# Patient Record
Sex: FEMALE | Race: WHITE | NOT HISPANIC OR LATINO | Employment: OTHER | URBAN - METROPOLITAN AREA
[De-identification: names, ages, dates, MRNs, and addresses within clinical notes are randomized per-mention and may not be internally consistent; named-entity substitution may affect disease eponyms.]

---

## 2021-06-29 ENCOUNTER — OFFICE VISIT - RIVER FALLS (OUTPATIENT)
Dept: FAMILY MEDICINE | Facility: CLINIC | Age: 81
End: 2021-06-29

## 2021-06-29 ASSESSMENT — MIFFLIN-ST. JEOR: SCORE: 1179.94

## 2021-08-10 ENCOUNTER — OFFICE VISIT - RIVER FALLS (OUTPATIENT)
Dept: FAMILY MEDICINE | Facility: CLINIC | Age: 81
End: 2021-08-10

## 2021-08-10 ASSESSMENT — MIFFLIN-ST. JEOR: SCORE: 1160.89

## 2022-02-12 VITALS
SYSTOLIC BLOOD PRESSURE: 122 MMHG | HEIGHT: 65 IN | DIASTOLIC BLOOD PRESSURE: 68 MMHG | WEIGHT: 159.4 LBS | BODY MASS INDEX: 25.86 KG/M2 | HEIGHT: 65 IN | WEIGHT: 155.2 LBS | BODY MASS INDEX: 26.56 KG/M2

## 2022-02-16 NOTE — LETTER
(Inserted Image. Unable to display)   October 06, 2021  RADHA EVANS  [address] [address]          Dear RADHA,      Thank you for selecting [facility] for your healthcare needs.    Your Healthcare Provider has recommended that you schedule a diabetes management appointment with our Certified Diabetes Educator, Sarah Beth Martinez RD, CD, CDE.     Please bring your glucose meter and your blood glucose diary to your appointment.    Available services include:  - Education about diabetes with guidance and support   - Education on the 7 Self Care Behaviors for Diabetes Management:     Healthy Eating   portion control, label readings, carbohydrate recommendations, heart healthy guidelines, meal planning    Being Active - Physical activity guidelines     Monitoring   blood glucose targets, evaluation of blood glucose readings and lab results    Taking Medication   medication management    Problem Solving   discuss any concerns/ questions     Healthy Coping   disease progression and management    Reducing Risks   prevention strategies to help avoid potential complications related to uncontrolled diabetes  - Weight loss strategies      (FYI   Regarding office visit: In some instances, a video visit may be offered as an option.)        To schedule an appointment or if you have further questions, please contact your clinic at [phone].      Powered by [employer]    Sincerely,    Sarah Beth Martinez R.D., C.D., C.D.E.

## 2022-02-16 NOTE — PROGRESS NOTES
Patient:   RADHA EVANS            MRN: [id number]            FIN: [id number]               Age:   81 years     Sex:  Female     :  1940   Associated Diagnoses:   Diabetes mellitus, type II   Author:   Sarah Beth Martinez      Visit Information   Visit type:  Diabetes Self Management Education .    Referral source:  Willard RON, [facility].       Chief Complaint   DM Type II      History of Present Illness   Family history of DM with her mother and 2 kids   New dx of DM Type II with Hgb a1c 8.7%    Discussed nutrition, diabetes, and lifestyle management with pt.  Pt does all of her own cooking and grocery shopping.    Nutrition:  am 2 sl toast with PB and occ SF Jam, healthy balance low carb juice, black coffee  afternoon cottage cheese and fruit or a sandwich  evening protein and vegetables   may have a couple cookies during the day   Physical Activity:  no routine   Stress:   low/ mod  Sleep: good  Support: family   BG Testing: education today BG in office 233mg/dL after eating   DM related medication: metformin ER 500mg has just started and will be increasing as directed to ii tabs daily      Routine diabetes care:  will discuss during follow up consults    Dilated Retinal Eye Exam:    Skin:  Dental:   Feet:   Immunizations:   Hgb A1c: 8.1% = 186 mg/dL estimated average glucose      Health Status   Allergies:    No active allergies have been recorded.   Medications:    Medications          No medications documented        Histories   Past Medical History:    No active or resolved past medical history items have been selected or recorded.   Family History:    No family history items have been selected or recorded.   Procedure history:    No active procedure history items have been selected or recorded.   Social History:             No active social history items have been recorded.      Physical Examination   Measurements from flowsheet : Measurements   2021 3:28 PM CDT Height  Measured - Standard 64.75 in    Weight Measured - Standard 159.4 lb    BSA 1.82 m2    Body Mass Index 26.73 kg/m2  HI         Health Maintenance      Recommendations     Pending (in the next year)        Due            Depression Screen due  06/29/21  and every 1  year(s)           Fall Risk Screen due  06/29/21  and every 1  year(s)           HIV Screen (if sexually active) due  06/29/21  and every 1  year(s)           High Blood Pressure Screen due  06/29/21  and every 1  year(s)           Lipid Disorders Screen due  06/29/21  and every 1  year(s)           Osteoporosis Screen due  06/29/21  and every 2  year(s)           Pneumococcal Vaccine due  06/29/21  One-time only           STD Counseling (if sexually active) due  06/29/21  and every 1  year(s)           Syphilis Screen (if sexually active) due  06/29/21  and every 1  year(s)           Tetanus Vaccine due  06/29/21  and every 10  year(s)           Tobacco Use Screen due  06/29/21  and every 1  year(s)           Zoster/Shingles Vaccine due  06/29/21  One-time only        Due In Future            Influenza Vaccine not due until  09/01/21  and every 1  year(s)     Satisfied (in the past 1 year)        Satisfied            Body Mass Index Check on  06/29/21.          Impression and Plan   Diagnosis     Diabetes mellitus, type II (PYQ33-RX E11.9).       Counseled: Patient, KAYLAH Self Care Behaviors   Today the patient was instructed on the basic physiology of diabetes mellitus, BG testing, and lifestyle guidelines.  Healthy Eating - Education on what foods are primary sources of carbohydrates and how intake affects BG readings, edu on basic carbohydrate counting, reading food labels, appropriate portion sizes, well balanced meals, diabetic plate method as a general rule.  Patient is provided with ideas to incorporate dietary recommendations, mindful eating techniques and weight loss tips.    Being Active - Education on how exercise helps with :    - lowering BG  by increasing the muscles ability to take up and use glucose   - weight loss   - healthier heart (improve lipid profile)   - improve sleep, mood, energy   - decrease stress      Monitoring - Today the patient is instructed on home blood glucose monitoring.  Pt is provided with a meter.  Pt is instructed on the proper use of the meter, recommended testing schedule and blood glucose target levels.  The patient is able to return appropriate demonstration of the use of the meter.  Pt is instructed on proper disposal of lancets.    Taking Medication - started on Metformin ER with MD - education on the mechanism of action, will be increasing as directed to ii tabs daily .    Problem Solving - medical support team assistance, resources provided (written and internet); good control of stress  Healthy Coping - support of friends, family, and medical support team   Reducing Risks - Will discuss at f/u apts.      Goals:  1.  A1c <7%  2.  BG testing 2x/ day on 3 days/ week before eating 80 - 130 target; two hours after eating 80 - 150mg/dL  3.  Physical active 60 min/ week   4.  Carb controlled heart healthy meal plan <130gm CHO/ day  5.  Take medications as directed continue metformin ER 500mg increasing to ii tabs daily     follow up in ~ 6 weeks        Professional Services   Time spent with pt 60 min   cc Dr. Lamar   cc Dr. Rueda [facility]

## 2022-02-16 NOTE — PROGRESS NOTES
Patient:   RADHA EVANS            MRN: [id number]            FIN: [id number]               Age:   81 years     Sex:  Female     :  1940   Associated Diagnoses:   None   Author:   Sarah Beth Martinez        Doctor: Willard   Patient Information  (Medicare and address information is on file)  Medicare HICN#: _  Address:_ City:_ State:_ Zip:_  Home Phone:_ Work Phone:_ Other Contact Phone:_    Diabetes self-management training (DSMT) and medical nutrition therapy (MNT) are individual and complementary services to improve diabetes care. For Medicare beneficiaries, both services can be ordered in the same year. Research indicates MNT combined with DSMT improves outcomes.    Diabetes Self-Managment Training (DSMT)  Medicare: 10 hours initial DSMT in 12 month period, plus 2 hours follow-up annually  *Check type of training services and number of hours requested:  X Initial DSMT:  X 10 hours or _ no. hrs. requested  _ Follow-up DSMT: _ 2 hours or _ no. hrs. requested  _ Additional insulin training: _ no. hrs. requested    *Patients with special needs requiring individual DSMT  Check all special needs that apply:  _ Vision _ Hearing  _ Physical  _Cognitive Impairment  _ Language limitations X Other  No classes offered/ pandemic precautions    *DSMT Content  X All ten content areas, as appropriate  _ Monitoring diabetes    _ Diabetes as disease process  _ Psychological adjustment _ Physical activity  _ Nutritional management  _ Goal setting, problem solving  _ Medications       _ Prevent, detect and treat acute complications  _ Preconception/pregnancy _ Prevent, detect and teat chronic complications     management or gestational     diabetes management    Medical Nutrition Therapy (MNT)  Medicare: 3 hours initial MNT in the first calendar year, plus two hours follow-up MNT annually. Additional MNT hours available for change in medical condition, treament and/or diagnosis.  *Check the type of MNT and/or number of  additional hours requested:  X  Initial MNT:   _ Annual follow-up MNT  _ Additional MNT services in the same calendar year, per RD recommendations  _ number of additional hours requested    Please specify change in medical condition, treatment and/or diagnosis      *Diagnosis  Please send recent labs for patient eligibility & outcomes monitoring  _ Type 1 uncontrolled  _ Type 1 controlled  X Type 2 uncontrolled  _ Type 2 controlled  _ Gestational diabetes _ Other _    Complications/Comorbidities  Check all that apply:  _ Hypertension   X Dyslipidemia   _ Nephropathy   _ Stroke  _ Neuropathy   _ Retinopathy  _ Renal disease   _ CHD  _ PVD      _ Pregnancy  _ Non-healing wound _ Obesity    _ Mental/affective disorder  _ Other _    Current Diabetes Medications  Specify type, dose and frequency  Oral: 500mg metformin ER ii tabs daily   Insulin:   Patient now uses: _ Pen _ Needle _ Pump    Patient Behavior Goals/Plan of Care    To gradually lose weight and improve blood sugar control.  Minimize the risk for hypoglycemia and reduce risks of developing complications related to uncontrolled diabetes.    Signature and UPIN #: (UPIN and NPI are on file)  Group/Practice name, address and phone: [facility], [facility]  [phone]

## 2022-02-16 NOTE — NURSING NOTE
Quick Intake Entered On:  6/29/2021 3:29 PM CDT    Performed On:  6/29/2021 3:28 PM CDT by Sarah Beth Martinez               Summary   Weight Measured :   159.4 lb(Converted to: 159 lb 6 oz, 72.303 kg)    Height Measured :   64.75 in(Converted to: 5 ft 5 in, 164.46 cm)    Body Mass Index :   26.73 kg/m2 (HI)    Body Surface Area :   1.82 m2   Sarah Beth Martinez - 6/29/2021 3:28 PM CDT

## 2022-02-16 NOTE — NURSING NOTE
Quick Intake Entered On:  8/10/2021 10:19 AM CDT    Performed On:  8/10/2021 10:19 AM CDT by Sarah Beth Martinez               Summary   Weight Measured :   155.2 lb(Converted to: 155 lb 3 oz, 70.398 kg)    Height Measured :   64.75 in(Converted to: 5 ft 5 in, 164.46 cm)    Body Mass Index :   26.02 kg/m2 (HI)    Body Surface Area :   1.79 m2   Systolic Blood Pressure :   122 mmHg   Diastolic Blood Pressure :   68 mmHg   Mean Arterial Pressure :   86 mmHg   Sarah Beth Martinez - 8/10/2021 10:19 AM CDT

## 2022-02-16 NOTE — PROGRESS NOTES
Patient:   RADHA EVANS            MRN: [id number]            FIN: [id number]               Age:   81 years     Sex:  Female     :  1940   Associated Diagnoses:   Diabetes mellitus, type II   Author:   Sarah Beth Martinez      Visit Information   Visit type:  Diabetes Self Management Education .    Referral source:  Willard RON, [facility].       Chief Complaint   DM Type II      History of Present Illness   2021  Family history of DM with her mother and 2 kids   New dx of DM Type II with Hgb a1c 8.7%    8/10/2021 - Initial follow up education - overall doing well expect pt couldn't remember how to test BG (reviewed today but quite confusing for pt, told her we will monitor labs and that if testing was too difficult to manage at home she should not worry)     Discussed nutrition, diabetes, and lifestyle management with pt.  Pt does all of her own cooking and grocery shopping.    Nutrition:  has been reading labels and following carb guidelines, 1 egg and 1 toast, healthy balance low carb juice, black coffee  afternoon cottage cheese and fruit or a sandwich  evening protein and vegetables, less potato and starch and less sweets   Physical Activity:  taking the dog out more   Stress:   low/ mod  Sleep: good  Support: family   BG Testing: education again today BG in office 128mg/dL significant improvement from 2021 233mg/dL after eating   DM related medication: metformin ER 500mg ii tabs daily tolerating well     Routine diabetes care:    Dilated Retinal Eye Exam:  goes q 5 weeks for injections with Dr. Garrido, annual eye exams   Skin: intact  Dental: q 6 mo UTD   Feet: goes to Dr. Hutchinson to have nails clipped  Immunizations: Covid, influenza and pneumonia vaccines all UTD  Hgb A1c: 8.1% = 186 mg/dL estimated average glucose 2021      Health Status   Allergies:    Allergic Reactions (Selected)  Severity Not Documented  NSAIDs (Gi - gastrointestinal bleed)  Penicillin  (Rash)  Proparacaine ophthalmic (No reactions were documented)  Tropicamide ophthalmic (No reactions were documented)   Medications:    Medications          No medications documented     Problem list:    All Problems  Unspecified macular degeneration / SNOMED CT 840244612 / Confirmed  Type 2 diabetes mellitus without complications / SNOMED CT 197761014 / Confirmed  Tobacco use / SNOMED CT 2501737997 / Confirmed  Solitary pulmonary nodule / SNOMED CT 6020663874 / Confirmed  Osteopenia / SNOMED CT 537893966 / Confirmed  Onychomycosis / SNOMED CT 4534652681 / Confirmed  Hypertrophy of tongue papillae / SNOMED CT 88984102 / Confirmed  Hyperlipidemia, unspecified / SNOMED CT 60454567 / Confirmed  Essential (primary) hypertension / SNOMED CT 63087363 / Confirmed  DJD (degenerative joint disease) / SNOMED CT 0581552722 / Confirmed  Cystocele, unspecified / SNOMED CT 867311358 / Confirmed  Colles' fracture of left radius, initial encounter for closed fracture / SNOMED CT 895499151 / Confirmed  Coccidioidomycosis, unspecified / SNOMED CT 228602120 / Confirmed  Chronic obstructive pulmonary disease, unspecified / SNOMED CT 81829208 / Confirmed  Central retinal vein occlusion, left eye, with macular edema / SNOMED CT 482915899 / Confirmed      Histories   Past Medical History:    Active  Type 2 diabetes mellitus without complications (197761014)  Tobacco use (8210702691)  Chronic obstructive pulmonary disease, unspecified (38230498)  Onychomycosis (6467018598)  Colles' fracture of left radius, initial encounter for closed fracture (031187906)  DJD (degenerative joint disease) (9303921005)  Solitary pulmonary nodule (6649335041)  Unspecified macular degeneration (103037262)  Osteopenia (369053661)  Hypertrophy of tongue papillae (15428868)  Cystocele, unspecified (359202534)  Coccidioidomycosis, unspecified (179268101)  Hyperlipidemia, unspecified (54958117)  Essential (primary) hypertension (55898543)  Central retinal vein  occlusion, left eye, with macular edema (934267529)   Family History:    Diabetes mellitus  Mother  Breast cancer  Grandmother (M): onset at 80 .  Mother: onset at 60 .  Hypertension  Mother  Heart disease  Mother  Osteoporosis  Mother  Grandmother (M)  [place] - Cancer  Brother  TIA  Father  Parkinson disease  Brother  Prostate cancer..  Father     Procedure history:    Mammogram (SNOMED CT 796644133) on 5/18/2020 at 80 Years.  Comments:  7/5/2021 11:17 AM Roseann Moore  Negative.  Esophagogastroduodenoscopy (SNOMED CT 538201673) on 3/4/2020 at 79 Years.  Colonoscopy (SNOMED CT 252857372) on 7/19/2019 at 79 Years.  Comments:  7/5/2021 11:20 AM Roseann Moore  Findings: Multiple polyps of ascending, hepatic flexure, transverse colon, splenic flexure, descending and sigmoid colon.    Plan: Repeat in 3 years.  Cataract extraction and insertion of intraocular lens (SNOMED CT 8961736457) on 8/30/2018 at 78 Years.  Colonoscopy (SNOMED CT 125179796) on 7/22/2016 at 76 Years.  Comments:  7/5/2021 11:21 AM Roseann Moore  Plan: Repeat every 3 years.  Colonoscopy (SNOMED CT 810147228) on 7/11/2014 at 74 Years.  Biopsy of lung (SNOMED CT 196334015) on 1/29/2014 at 73 Years.  Colonoscopy (SNOMED CT 019185451) on 9/24/2008 at 68 Years.  Colonoscopy (SNOMED CT 847120791) on 9/21/2005 at 65 Years.  Colonoscopy (SNOMED CT 720344895) on 6/27/2002 at 62 Years.  Repair of Achilles tendon (SNOMED CT 823255015) on 11/24/1999 at 59 Years.  Biopsy of breast (SNOMED CT 049077178) on 6/28/1999 at 59 Years.  Comments:  7/5/2021 11:25 AM Roseann Moore  Left.  Biopsy of breast (SNOMED CT 464708184) on 6/30/1997 at 57 Years.  Comments:  7/5/2021 11:25 AM Roseann Moore  Right.  Biopsy of breast (SNOMED CT 147579953) on 9/26/1996 at 56 Years.  Comments:  7/5/2021 11:26 AM CDT - Roseann Roberts.  D&C - Dilatation and curettage (SNOMED CT 1960014663) in 1967 at 27 Years.  CE - Cataract extraction (SNOMED CT  6366337958).  Biopsy of lung (SNOMED CT 535558132).  Comments:  2021 12:07 PM CDT - Roseann Roberts  Needle biopsy.   Social History:        Electronic Cigarette/Vaping Assessment            Electronic Cigarette Use: Never.      Alcohol Assessment: Current            1-2 times per week      Tobacco Assessment: Current            10 or more cigarettes (1/2 pack or more)/day in last 30 days, Cigarettes, 20 per day.  Total pack years: 60.      Substance Abuse Assessment: Denies Substance Abuse            Never      Employment and Education Assessment            Retired      Home and Environment Assessment            Marital status: .  Spouse/Partner name: Tye -  .  Living situation: Home/Independent.      Nutrition and Health Assessment            Type of diet: Regular.      Exercise and Physical Activity Assessment: Does not exercise      Sexual Assessment            Sexually active: No.  Identifies as female, Sexual orientation: Straight or heterosexual.        Physical Examination   Vital Signs   8/10/2021 10:19 AM CDT Systolic Blood Pressure 122 mmHg    Diastolic Blood Pressure 68 mmHg    Mean Arterial Pressure 86 mmHg      Measurements from flowsheet : Measurements   8/10/2021 10:19 AM CDT Height Measured - Standard 64.75 in    Weight Measured - Standard 155.2 lb    BSA 1.79 m2    Body Mass Index 26.02 kg/m2  HI         Health Maintenance      Recommendations     Pending (in the next year)        Due            Asthma - Assessment due  08/10/21  and every 1  year(s)           Asthma - Spirometry due  08/10/21  and every 1  year(s)           DM - Communication with Managing Provider due  08/10/21  and every 1  year(s)           DM - Eye Exam due  08/10/21  and every 1  year(s)           DM - Foot Exam due  08/10/21  and every 1  year(s)           DM - HgbA1c due  08/10/21  Variable frequency           DM - Microalbumin due  08/10/21  and every 1  year(s)           Depression Screen due   08/10/21  and every 1  year(s)           Fall Risk Screen due  08/10/21  and every 1  year(s)           HIV Screen (if sexually active) due  08/10/21  and every 1  year(s)           Lipid Disorders Screen due  08/10/21  and every 1  year(s)           Osteoporosis Screen due  08/10/21  and every 2  year(s)           Pneumococcal Vaccine due  08/10/21  One-time only           STD Counseling (if sexually active) due  08/10/21  and every 1  year(s)           Syphilis Screen (if sexually active) due  08/10/21  and every 1  year(s)           Zoster/Shingles Vaccine due  08/10/21  One-time only        Near Due            Influenza Vaccine near due  09/01/21  and every 1  year(s)     Satisfied (in the past 1 year)        Satisfied            Body Mass Index Check on  08/10/21.           Body Mass Index Check on  06/29/21.           High Blood Pressure Screen on  08/10/21.           Influenza Vaccine on  09/05/20.          Impression and Plan   Diagnosis     Diabetes mellitus, type II (AQA53-BN E11.9).       Counseled: Patient, KAYLAH Self Care Behaviors   Today the patient was provided with a review and further instruction on the progression of diabetes mellitus, prevention of heart disease, prevention of complications, and lifestyle guidelines.  Healthy Eating - review of carbohydrate counting, reading food labels, appropriate portion sizes, well balanced meals, diabetic plate method as a general rule.  Patient is provided with additional ideas to incorporate dietary recommendations, increase meal planning and preparation.  Mindful eating, finding other coping mechanisms besides food  Instructed on Therapeutic Lifestyle Changes (TLC) nutrition plan for heart healthy eating.     Low cholesterol (<200mg), low fat, low saturated fat, zero trans fat   Low sodium (2000mg)   High fiber diet (20 - 30gm); Soluble fiber 10+ gm/ day    Eat more omega 3 fats  Vegetarian at least 1 day per week  Being Active - Education on how exercise  helps with :    - lowering BG by increasing the muscles ability to take up and use glucose   - weight loss   - healthier heart (improve lipid profile)   - improve sleep, mood, energy   - decrease stress  Monitoring -   Reviewed instructions on home blood glucose monitoring.  Pt is provided with a meter.  Pt is instructed on the proper use of the meter, recommended testing schedule and blood glucose target levels.  The patient is able to return appropriate demonstration of the use of the meter with cues.  Pt is instructed on proper disposal of lancets.    Taking Medication - on Metformin ER with MD - education on the mechanism of action, continue with ii tabs daily .    Problem Solving - medical support team assistance, resources provided (written); good control of stress  Healthy Coping - support of friends, family, and medical support team   Reducing Risks - Detailed education on potential complications associated with uncontrolled diabetes and prevention recommendations.  Recommendations include: annual eye exam, good oral cares with brushing BID and flossing daily, routine dental appointments, good foot care tips and shoe wear - discussed monofilament test yearly.  Importance of adequate sleep and good control of BG to prevent developing complications related to uncontrolled DM including nephropathy, neuropathy, retinopathy, and cardiovascular disease.  Weight loss goal of 7 - 10% of current body weight pounds as a reasonable goal to help increase insulin sensitivity        Goals:  1.  A1c <7%  2.  BG testing 2x/ day on 0-3 days/ week before eating 80 - 130 target; two hours after eating 80 - 150mg/dL  3.  Physical active 60 min/ week   4.  Carb controlled heart healthy meal plan <130gm CHO/ day  5.  Take medications as directed continue metformin ER 500mg increasing to ii tabs daily     follow up in ~ 6 weeks        Professional Services   Time spent with pt 60 min   cc Dr. Lamar   cc Dr. Mohit Vance  [facility]